# Patient Record
Sex: MALE | URBAN - METROPOLITAN AREA
[De-identification: names, ages, dates, MRNs, and addresses within clinical notes are randomized per-mention and may not be internally consistent; named-entity substitution may affect disease eponyms.]

---

## 2021-06-29 ENCOUNTER — OFFICE VISIT (OUTPATIENT)
Dept: URBAN - METROPOLITAN AREA CLINIC 91 | Facility: CLINIC | Age: 71
End: 2021-06-29
Payer: COMMERCIAL

## 2021-06-29 DIAGNOSIS — Z96.1 PRESENCE OF PSEUDOPHAKIA: ICD-10-CM

## 2021-06-29 DIAGNOSIS — H54.415A BLINDNESS RIGHT EYE CATEGORY 5, NORMAL VISION LEFT EYE: ICD-10-CM

## 2021-06-29 DIAGNOSIS — H01.006 BLEPHARITIS OF LEFT EYELID: Primary | ICD-10-CM

## 2021-06-29 PROCEDURE — 92002 INTRM OPH EXAM NEW PATIENT: CPT | Performed by: SPECIALIST

## 2021-06-29 RX ORDER — ERYTHROMYCIN 5 MG/G
OINTMENT OPHTHALMIC
Qty: 3.5 | Refills: 3 | Status: ACTIVE
Start: 2021-06-29

## 2021-06-29 ASSESSMENT — INTRAOCULAR PRESSURE
OS: 22
OD: 24

## 2021-06-29 NOTE — IMPRESSION/PLAN
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 Plan: Patient has NLP in the right eye.

## 2021-06-29 NOTE — IMPRESSION/PLAN
Impression: Blepharitis of left eyelid: H01.006. Plan: For blepharitis, patient was advised to use warm compresses 2-4 times per day, followed by lid scrubs and ophthalmic ointment at bedtime. Continue erythromycin ointment as directed and prescribed by Dr. Ke Nguyễn Recheck in 6 weeks